# Patient Record
Sex: MALE | Race: WHITE
[De-identification: names, ages, dates, MRNs, and addresses within clinical notes are randomized per-mention and may not be internally consistent; named-entity substitution may affect disease eponyms.]

---

## 2019-01-21 NOTE — DISCHARGE INSTRUCTIONS
Discharge Instructions


Discharge Instructions


Follow up with:  Dr. Barksdale 1/23/19 in his  office


Diet:  regular


Resume Normal Activity?:  No


Activity:  light activity


Pneumonia Vaccine:  pt refused vaccine


Influenza Vaccine (Oct to Mar):  pt refused vaccine


Return to Work/School on:  Feb 4, 2019


Special Instructions


Pt has printed instructions which we have reviewed in my office on 1/21/19





For Surgical Patients


Dressing Care:  keep dry and clean


May shower:  No


Contact your physician for:  bleeding, pain, tenderness, redness, swelling, 

yellowish discharge in the op. site





For Congestive Heart Failure


Reminder


Report to your physician any weight gain of 5 pounds or more in one week.











Marcus Barksdale MD Jan 21, 2019 18:53

## 2019-01-21 NOTE — PRE-PROCEDURE NOTE/ATTESTATION
Pre-Procedure Note/Attestation


Complete Prior to Procedure


Planned Procedure:  bilateral


Procedure Narrative:


Full facelift wtih submental and bilateral lateral brow lift.





Indications for Procedure


Pre-Operative Diagnosis:


Dermantchalasis of face, neck and lateral eyebrows.





Attestation


I attest that I discussed the nature of the procedure; its benefits; risks and 

complications; and alternatives (and the risks and benefits of such alternatives

), prior to the procedure, with the patient (or the patient's legal 

representative).





I attest that, if there was a reasonable possibility of needing a blood 

transfusion, the patient (or the patient's legal representative) was given the 

[facility] standardized written summary, pursuant 

to the [place] Blood Safety Act ([place] Health and Safety Code # 1645, as 

amended).





I attest that I re-evaluated the patient just prior to the surgery and that 

there has been no change in the patient's H&P.  have reviewed and will sign the 

PMD H/P done a few weeks ago. 


I haev repeated that exam today 1/21/19 in my office. It is unchanged.











Marcus Barksdale MD Jan 21, 2019 18:46

## 2019-01-21 NOTE — BRIEF OPERATIVE NOTE
Immediate Post Operative Note


Operative Note


Chief Complaint:  Dermatochalasis face, neck and lateral eyebrows


Pre-op Diagnosis:


Dermantchalasis of face, neck and lateral eyebrows.


Procedure:


Full facelift with submental and lateral brow lift.


Post-op Diagnosis:  same as pre-op


Surgeon:  Marcus Barksdale


Assistant:  none


Additional Surgeons:  none


Anesthesiologist:  Dr. Marmolejo


Anesthesia:  MAC


Specimen:  none


Complications:  none


Condition:  stable


Fluids:  D5LR


Estimated Blood Loss:  volume - 75 cc


Drains:  penrose - submental


Packing:  none


Implant(s) used?:  No











Marcus Barksdale MD Jan 21, 2019 18:49

## 2019-01-22 ENCOUNTER — HOSPITAL ENCOUNTER (OUTPATIENT)
Dept: HOSPITAL 72 - SUR | Age: 74
Discharge: HOME | End: 2019-01-22
Payer: SELF-PAY

## 2019-01-22 VITALS — DIASTOLIC BLOOD PRESSURE: 63 MMHG | SYSTOLIC BLOOD PRESSURE: 115 MMHG

## 2019-01-22 VITALS — SYSTOLIC BLOOD PRESSURE: 131 MMHG | DIASTOLIC BLOOD PRESSURE: 67 MMHG

## 2019-01-22 VITALS — SYSTOLIC BLOOD PRESSURE: 113 MMHG | DIASTOLIC BLOOD PRESSURE: 70 MMHG

## 2019-01-22 VITALS — DIASTOLIC BLOOD PRESSURE: 67 MMHG | SYSTOLIC BLOOD PRESSURE: 128 MMHG

## 2019-01-22 VITALS — DIASTOLIC BLOOD PRESSURE: 76 MMHG | SYSTOLIC BLOOD PRESSURE: 133 MMHG

## 2019-01-22 VITALS — DIASTOLIC BLOOD PRESSURE: 70 MMHG | SYSTOLIC BLOOD PRESSURE: 111 MMHG

## 2019-01-22 VITALS — DIASTOLIC BLOOD PRESSURE: 73 MMHG | SYSTOLIC BLOOD PRESSURE: 120 MMHG

## 2019-01-22 VITALS — DIASTOLIC BLOOD PRESSURE: 75 MMHG | SYSTOLIC BLOOD PRESSURE: 108 MMHG

## 2019-01-22 VITALS — DIASTOLIC BLOOD PRESSURE: 77 MMHG | SYSTOLIC BLOOD PRESSURE: 125 MMHG

## 2019-01-22 VITALS — HEIGHT: 68 IN | BODY MASS INDEX: 23.64 KG/M2 | WEIGHT: 156 LBS

## 2019-01-22 VITALS — SYSTOLIC BLOOD PRESSURE: 135 MMHG | DIASTOLIC BLOOD PRESSURE: 7 MMHG

## 2019-01-22 VITALS — DIASTOLIC BLOOD PRESSURE: 66 MMHG | SYSTOLIC BLOOD PRESSURE: 129 MMHG

## 2019-01-22 VITALS — SYSTOLIC BLOOD PRESSURE: 124 MMHG | DIASTOLIC BLOOD PRESSURE: 69 MMHG

## 2019-01-22 DIAGNOSIS — Z86.19: ICD-10-CM

## 2019-01-22 DIAGNOSIS — E78.5: ICD-10-CM

## 2019-01-22 DIAGNOSIS — K21.9: ICD-10-CM

## 2019-01-22 DIAGNOSIS — I10: ICD-10-CM

## 2019-01-22 DIAGNOSIS — L98.7: Primary | ICD-10-CM

## 2019-01-22 DIAGNOSIS — Z87.891: ICD-10-CM

## 2019-01-22 DIAGNOSIS — Z86.010: ICD-10-CM

## 2019-01-22 DIAGNOSIS — Z79.82: ICD-10-CM

## 2019-01-22 DIAGNOSIS — E03.9: ICD-10-CM

## 2019-01-22 DIAGNOSIS — H57.813: ICD-10-CM

## 2019-01-22 DIAGNOSIS — Z91.041: ICD-10-CM

## 2019-01-22 PROCEDURE — 67900 REPAIR BROW DEFECT: CPT

## 2019-01-22 PROCEDURE — 15825 RHYTDCT NCK PLTYSML TGHTG: CPT

## 2019-01-22 PROCEDURE — 94150 VITAL CAPACITY TEST: CPT

## 2019-01-22 PROCEDURE — 94003 VENT MGMT INPAT SUBQ DAY: CPT

## 2019-01-22 NOTE — IMMEDIATE POST-OP EVALUATION
Immediate Post-Op Evalulation


Immediate Post-Op Evalulation


Procedure:  Complete facelift


Date of Evaluation:  Jan 22, 2019


Time of Evaluation:  11:30


IV Fluids:  800


Blood Products:  none


Estimated Blood Loss:  100


Urinary Output:  none


Blood Pressure Systolic:  125


Blood Pressure Diastolic:  78


Pulse Rate:  78


Respiratory Rate:  20


O2 Sat by Pulse Oximetry:  99


Temperature (Fahrenheit):  97.6


Pain Score (1-10):  2


Nausea:  No


Vomiting:  No


Complications


none


Patient Status:  awake, patent


Hydration Status:  adequate











Luiz Parr MD Jan 22, 2019 11:31

## 2019-01-22 NOTE — ANETHESIA PREOPERATIVE EVAL
Anesthesia Pre-op PMH/ROS


General


Date of Evaluation:  Jan 22, 2019


Time of Evaluation:  07:12


Anesthesiologist:  Karolyn


ASA Score:  ASA 2


Mallampati Score


Class I : Soft palate, uvula, fauces, pillars visible


Class II: Soft palate, uvula, fauces visible


Class III: Soft palate, base of uvula visible


Class IV: Only hard plate visible


Mallampati Classification:  Class II


Surgeon:  Apolonia


Diagnosis:  Cosmetic apperance


Surgical Procedure:  Total facelift


Anesthesia History:  none


Family History:  no anesthesia problems


Allergies:  


Uncoded Allergies:  


     CONTRAST MEDIA-IODINE BASE (Allergy, Severe, 1/22/19)


 CREATININE LEVEL WENT UP


     DECONGESTANT COLD FORMULA (Allergy, Severe, 1/22/19)


 ELEVATED BLOOD PRESSURE


Patient NPO?:  Yes





Past Medical History


Cardiovascular:  Reports: HTN - mild; 


   Denies: CAD, MI, valve dz, arrhythmia, other


Pulmonary:  Denies: asthma, COPD, ERASTO, other


Gastrointestinal/Genitourinary:  Reports: GERD; 


   Denies: CRI, ESRD, other


Neurologic/Psychiatric:  Denies: dementia, CVA, depression/anxiety, TIA, other


Endocrine:  Reports: hypothyroidism; 


   Denies: DM, steroids, other


HEENT:  Denies: cataract (L), cataract (R), glaucoma, [place] (L), [place] (R), other


Hematology/Immune:  Denies: anemia, DVT, bleeding disorder, other


Musculoskeletal/Integumentary:  Denies: OA, RA, DJD, DDD, edema, other


PMH Narrative:


as above


PSxH Narrative:


cosmetic Sx, Cysto.





Anesthesia Pre-op Phys. Exam


Physician Exam





Last Vital Signs








  Date Time  Temp Pulse Resp B/P (MAP) Pulse Ox O2 Delivery O2 Flow Rate FiO2


 


1/22/19 06:36      Room Air  


 


1/22/19 06:35 97.9 64 20 133/76 97   








Constitutional:  NAD


Neurologic:  CN 2-12 intact


Cardiovascular:  RRR, no M/R/G


Respiratory:  CTA


Gastrointestinal:  S/NT/ND





Airway Exam


Mallampati Score:  Class II


MO:  full


Neck:  flexible


ROM:  full


Teeth:  intact


Dentures:  no upper, no lower





Anesthesia Pre-op A/P


Labs


see chart





Studies


Pre-op Studies:  EKG - NSR





Risk Assessment & Plan


Assessment:


ASA 2


Plan:


MAC with local


Status Change Before Surgery:  No





Pre-Antibiotics


Drug:  Ancef 1gr.


Given Within 1 Hr of Incision:  Yes


Time Given:  07:45











Luiz Parr MD Jan 22, 2019 08:23

## 2019-01-22 NOTE — 48 HOUR POST ANESTHESIA EVAL
Post Anesthesia Evaluation


Procedure:  Complete facelift


Date of Evaluation:  Jan 22, 2019


Time of Evaluation:  13:36


Blood Pressure Systolic:  131


0:  67


Pulse Rate:  62


Respiratory Rate:  20


Temperature (Fahrenheit):  97.6


O2 Sat by Pulse Oximetry:  98


Airway:  patent


Nausea:  No


Vomiting:  No


Pain Intensity:  1


Hydration Status:  adequate


Cardiopulmonary Status:


stable


Mental Status/LOC:  patient returned to baseline


Follow-up Care/Observations:


n/a


Post-Anesthesia Complications:


none


Follow-up care needed:  ready to discharge











Luiz Parr MD Jan 22, 2019 13:37

## 2019-01-22 NOTE — OPERATIVE NOTE - DICTATED
DATE OF OPERATION:  01/22/2019

SURGEON:  Marcus Barksdale M.D.



ASSISTANT:  None.



ANESTHESIOLOGIST:  Dr. Goodrich.



ANESTHESIA:  IV sedation as well as 60 mL 50:50 mixture of 0.5% bupivacaine

and 1% lidocaine with 1000 epinephrine.  This was initially 30 injected

and the remaining 30 injected over the remainder of the approximate 3

hours of the case in different areas.



PREOPERATIVE DIAGNOSIS:  Dermatochalasis of the face and neck as well as

drooping lateral eyebrows.



POSTOPERATIVE DIAGNOSIS:  Dermatochalasis of the face and neck as well as

drooping lateral eyebrows.



FINDING:  Dermatochalasis of the face and neck as well as drooping lateral

eyebrows.



PROCEDURE:  Facelift submental including and lateral eyebrow lift

included.



TECHNIQUE:  The patient prepped and draped in usual manner.  Time-out was

performed.  I then injected the right and left midface and neck with the

aforementioned lidocaine, Sensorcaine, epinephrine mixture.  I then

proceeded to start on the left neck incision, which I had made in 2006 or

2007 and cut out the scar tissue in front and behind the ear.  This then

was elevated with a small Metzenbaum scissors.  There was minimal bleeding

there.  Fortunately, there was nice scar tissue from a previous procedure

and the plane was easily found.  I then developed this mass, cut out

tissue anterior to the tragus, and pulled this mass back both from the

platysma and from the cheek to flatten out the face and removed

approximately an inch and half of skin.  This was closed with an after

cutting darts and cutting out extra tissue.  This was closed with 4-0

plain suture deep and 5-0 Prolene anteriorly and 4-0 Prolene

posteriorly.



The same was done on the contralateral side, which would be the right.



I then turned my attention to the submental.  A curvilinear incision was

made.  I went down between the platysma and tied these together.  This was

done with Mersilene.  I then closed with a 4-0 plain suture deep and 6-0

Prolene in the incision.  I did put a drain in the submental.



I then proceeded to turn my attention to the eyebrow and made an

incision in the hairline, tunneled this subcutaneously to above the

eyebrow and existing line, made a small incision, placed a Mersilene and

pulled back through, and then tied it superiorly.  Each lateral eyebrow

was approximately 2 centimeters from the lateral crease.  Same procedure

done on the lateral left side as well.  Both these incisions were closed

with 6-0 Prolene horizontal mattress suture and then tissue glue placed

over this.



We then cleaned the patient with wet lap and then a dry lap.  Antibiotic

ointment, Telfa, cotton, and netting was placed over and tape over this to

hold everything in place.



The patient was stable and awake in the operating room prior to transfer

to the recovery room.



EBL:  Approximately 75 mL.



COMPLICATIONS:  None.



DRAINS:  One Penrose in the submental area.  He is scheduled to see me

tomorrow in my office at 11:15.









  ______________________________________________

  Marcus Barksdale M.D.





DR:  NADINE

D:  01/22/2019 11:40

T:  01/22/2019 14:42

JOB#:  [id number]

CC:



EHSAN